# Patient Record
Sex: FEMALE | NOT HISPANIC OR LATINO | URBAN - METROPOLITAN AREA
[De-identification: names, ages, dates, MRNs, and addresses within clinical notes are randomized per-mention and may not be internally consistent; named-entity substitution may affect disease eponyms.]

---

## 2020-07-20 ENCOUNTER — TELEPHONE ENCOUNTER (OUTPATIENT)
Dept: URBAN - METROPOLITAN AREA CLINIC 35 | Facility: CLINIC | Age: 75
End: 2020-07-20

## 2020-07-20 ENCOUNTER — OFFICE VISIT (OUTPATIENT)
Dept: URBAN - METROPOLITAN AREA CLINIC 35 | Facility: CLINIC | Age: 75
End: 2020-07-20

## 2020-07-20 VITALS — HEIGHT: 64 IN | BODY MASS INDEX: 22.53 KG/M2 | WEIGHT: 132 LBS

## 2020-07-20 PROBLEM — 196735001 CSG - CHRONIC SUPERFICIAL GASTRITIS: Status: ACTIVE | Noted: 2018-05-07

## 2020-07-20 PROBLEM — 428283002 HISTORY OF POLYP OF COLON (SITUATION): Status: ACTIVE | Noted: 2018-03-07

## 2020-07-20 PROBLEM — 302914006 BARRETT'S ESOPHAGUS: Status: ACTIVE | Noted: 2018-05-07

## 2020-07-20 RX ORDER — LYSINE 500 MG
1 TABLET ORAL DAILY
Status: ON HOLD | COMMUNITY

## 2020-07-20 RX ORDER — NIACINAMIDE 500 MG
AS DIRECTED TABLET ORAL
Status: DISCONTINUED | COMMUNITY

## 2020-07-20 RX ORDER — AMITRIPTYLINE HYDROCHLORIDE 10 MG/1
1/2 TABLET TABLET, FILM COATED ORAL
Status: ACTIVE | COMMUNITY
Start: 2019-03-06

## 2020-07-20 RX ORDER — POLYPODIUM LEUCOTOMOS EXTRACT 240 MG
1 CAPSULE CAPSULE ORAL ONCE A DAY
Qty: 30 | Status: ACTIVE | COMMUNITY

## 2020-07-20 RX ORDER — LYSINE 500 MG
AS DIRECTED TABLET ORAL
Status: ACTIVE | COMMUNITY

## 2020-07-20 RX ORDER — PANTOPRAZOLE SODIUM 40 MG/1
1 TABLET TABLET, DELAYED RELEASE ORAL ONCE A DAY
Qty: 90 TABLET | Refills: 3 | Status: ACTIVE | COMMUNITY
Start: 2018-05-07

## 2020-07-20 RX ORDER — GUARN/MA-HUANG/P.GIN/S.GINSENG
2 TABLET ORAL TWICE A DAY
Status: ACTIVE | COMMUNITY

## 2020-07-20 RX ORDER — POLYETHYLENE GLYCOL 3350 17 G/17G
2 TABLESPOONS POWDER, FOR SOLUTION ORAL ONCE A DAY
Status: ON HOLD | COMMUNITY

## 2020-07-20 RX ORDER — CHOLECALCIFEROL (VITAMIN D3) 50 MCG
1 TABLET TABLET ORAL ONCE A DAY
Qty: 30 | Status: ACTIVE | COMMUNITY

## 2020-07-20 RX ORDER — FAMOTIDINE 40 MG/1
1 TABLET AT BEDTIME TABLET, FILM COATED ORAL ONCE A DAY
Qty: 30 | Refills: 1 | Status: ACTIVE | COMMUNITY
Start: 2019-10-25

## 2020-07-20 RX ORDER — ACITRETIN 10 MG/1
1 CAPSULE WITH A MEAL CAPSULE ORAL ONCE A DAY
Status: ON HOLD | COMMUNITY

## 2020-07-20 RX ORDER — DICLOFENAC SODIUM 75 MG/1
1 TABLET WITH FOOD OR MILK TABLET, DELAYED RELEASE ORAL TWICE A DAY
Qty: 60 | Status: ACTIVE | COMMUNITY

## 2020-07-20 RX ORDER — MULTIVITAMIN WITH IRON
1 TABLET TABLET ORAL ONCE A DAY
Status: ACTIVE | COMMUNITY

## 2020-07-20 NOTE — HPI-MIGRATED HPI
;   ;     Tellez's Esophagus : 75 y/o female patient presents today for a follow up of Tellez's Esophagus. Patient reports a flare up that began 1-2 weeks ago. She describes her symptoms as mild nausea and borborygmus. She admits that after each meal mainly with sugar incorporated she will continue to have episodes of eructation that will last for 30 minutes.   Her last colonoscopy was completed in 2018. She admits the continued use of  Pantoprazole 40 mg 1 QD and Famotidine 40 mg.  She denies that these medications relieve her symptoms. She would like to know if she could increase the these medications at this time or should she try a different medication.   She denies dysphagia, globus, odynophagia.   She has been adhering to anti reflux diet.   She currently admits 1 bowel movement with the use of MiraLax. She admits a longstanding hx of this. She admits that her stools are formed and soft with no blood or mucus present.   She admits that she walks 3-4 miles every other day. And will lift weights on the alternate days.    Last visit (10/25/2019) On last visit 03/06/2019, Patient was found to have Tellez's esophagus via EGD (4/25/2018) She has been taking Pantoprazole 40 mg 1 QD.  She denies dysphagia, globus, odynophagia, or heartburn.   She has been adhering to anti reflux diet.  Admit belching postprandially since April 2018, less frequent since last visit. Patient was found to have Tellez's esophagus via EGD (4/25/2018) She has been taking Pantoprazole 40 mg 1 QD.  She denies dysphagia, globus, odynophagia, or heartburn.   She has been adhering to anti reflux diet.  Admit belching postprandially since April 2018, less frequent since last visit. ;   Nausea : Patient admits episodes of mild nausea once every 2 weeks since her last visit.   Last visit (10/25/2019) Patient presents today with complaints of nausea, especially when her "stomach acts up." The nausea will come and go. She states that her most recent bout with nausea lasted for 2 weeks.   She denies any vomiting, dysphagia, heartburn/acid reflux.  She admits decreased appetite.  She states that she can't pin point triggers to the nausea. She states that her appetite is small and will consume protein powder to supplement her nutrition.   She will take Phenergan PRN, rarely with relief of symptoms. She has tried Zofran, FD guard, Omeprazole 40, Paroxetine HCL 10 mg in the past with minimal relief of symptoms  She is currently taking Pantoprazole 40 mg one tab daily with control of heartburn/reflux symptoms.  She was advised to take Ranitidine 150 mg which she doesn't take that often and she didn't notice any relief of symptoms with the Ranitidine.  She had a GE study 02/2019 that was normal. She had an EGD 02/19/2019 that revealed Tellez's esophagus.  On last visit 03/06/2019, Admit nausea symptoms over the past year.  Symptoms occur daily without provocation.  She will take Phenergan 25 mg 1/2 prn with some relief. Admit nausea symptoms over the past year.  Symptoms occur daily without provocation.  She will take Phenergan 25 mg 1/2 prn with some relief. ;

## 2020-07-23 ENCOUNTER — TELEPHONE ENCOUNTER (OUTPATIENT)
Dept: URBAN - METROPOLITAN AREA CLINIC 35 | Facility: CLINIC | Age: 75
End: 2020-07-23

## 2020-08-18 ENCOUNTER — TELEPHONE ENCOUNTER (OUTPATIENT)
Dept: URBAN - METROPOLITAN AREA CLINIC 35 | Facility: CLINIC | Age: 75
End: 2020-08-18

## 2020-08-18 RX ORDER — RIFAXIMIN 550 MG/1
1 TABLET TABLET ORAL THREE TIMES A DAY
Qty: 42 TABLET | Refills: 0 | OUTPATIENT
Start: 2020-08-18

## 2020-08-19 ENCOUNTER — TELEPHONE ENCOUNTER (OUTPATIENT)
Dept: URBAN - METROPOLITAN AREA CLINIC 35 | Facility: CLINIC | Age: 75
End: 2020-08-19

## 2020-08-19 RX ORDER — RIFAXIMIN 550 MG/1
1 TABLET TABLET ORAL THREE TIMES A DAY
Qty: 42 | Refills: 0 | OUTPATIENT
Start: 2020-08-18

## 2020-08-20 ENCOUNTER — TELEPHONE ENCOUNTER (OUTPATIENT)
Dept: URBAN - METROPOLITAN AREA CLINIC 35 | Facility: CLINIC | Age: 75
End: 2020-08-20

## 2020-08-21 ENCOUNTER — TELEPHONE ENCOUNTER (OUTPATIENT)
Dept: URBAN - METROPOLITAN AREA CLINIC 35 | Facility: CLINIC | Age: 75
End: 2020-08-21

## 2020-08-21 RX ORDER — FLUCONAZOLE 150 MG/1
1 TABLET TABLET ORAL ONCE A DAY
Qty: 2 TABLET | Refills: 0 | OUTPATIENT
Start: 2020-08-24

## 2020-09-08 ENCOUNTER — TELEPHONE ENCOUNTER (OUTPATIENT)
Dept: URBAN - METROPOLITAN AREA CLINIC 35 | Facility: CLINIC | Age: 75
End: 2020-09-08

## 2020-10-13 ENCOUNTER — TELEPHONE ENCOUNTER (OUTPATIENT)
Dept: URBAN - METROPOLITAN AREA CLINIC 35 | Facility: CLINIC | Age: 75
End: 2020-10-13

## 2020-10-13 RX ORDER — AMITRIPTYLINE HYDROCHLORIDE 10 MG/1
1 TABLET AT BEDTIME TABLET, FILM COATED ORAL ONCE A DAY
Qty: 30 | Refills: 3
Start: 2019-03-06

## 2020-10-21 ENCOUNTER — TELEPHONE ENCOUNTER (OUTPATIENT)
Dept: URBAN - METROPOLITAN AREA CLINIC 35 | Facility: CLINIC | Age: 75
End: 2020-10-21

## 2020-10-21 RX ORDER — FAMOTIDINE 40 MG/1
1 TABLET AT BEDTIME TABLET, FILM COATED ORAL ONCE A DAY
Qty: 30 | Refills: 6
Start: 2019-10-25

## 2020-11-25 ENCOUNTER — TELEPHONE ENCOUNTER (OUTPATIENT)
Dept: URBAN - METROPOLITAN AREA CLINIC 35 | Facility: CLINIC | Age: 75
End: 2020-11-25

## 2020-11-25 RX ORDER — PANTOPRAZOLE SODIUM 40 MG/1
1 TABLET TABLET, DELAYED RELEASE ORAL ONCE A DAY
Qty: 90 | Refills: 1
Start: 2018-05-07

## 2020-11-25 RX ORDER — AMITRIPTYLINE HYDROCHLORIDE 10 MG/1
1 TABLET AT BEDTIME TABLET, FILM COATED ORAL ONCE A DAY
Qty: 90 TABLET | Refills: 1
Start: 2019-03-06

## 2020-12-07 ENCOUNTER — TELEPHONE ENCOUNTER (OUTPATIENT)
Dept: URBAN - METROPOLITAN AREA CLINIC 35 | Facility: CLINIC | Age: 75
End: 2020-12-07

## 2021-08-31 ENCOUNTER — TELEPHONE ENCOUNTER (OUTPATIENT)
Dept: URBAN - METROPOLITAN AREA CLINIC 35 | Facility: CLINIC | Age: 76
End: 2021-08-31

## 2023-08-14 ENCOUNTER — OFFICE VISIT (OUTPATIENT)
Dept: URBAN - METROPOLITAN AREA CLINIC 35 | Facility: CLINIC | Age: 78
End: 2023-08-14

## 2023-08-14 NOTE — HPI-BARRETT'S ESOPHAGUS
77 year old female patient presents today for a follow up of Tellez's Esophagus and a surveillance EGD. Admits/Denies continued use of Pantoprazole and Pepcid prn. Admits/Denies any early satiety, dysphagia, or globus.   Last visit (2020): 75 y/o female patient presents today for a follow up of Tellez's Esophagus. Patient reports a flare up that began 1-2 weeks ago. She describes her symptoms as mild nausea and borborygmus. She admits that after each meal mainly with sugar incorporated she will continue to have episodes of eructation that will last for 30 minutes.   Her last colonoscopy was completed in 2018. She admits the continued use of  Pantoprazole 40 mg 1 QD and Famotidine 40 mg.  She denies that these medications relieve her symptoms. She would like to know if she could increase the these medications at this time or should she try a different medication.   She denies dysphagia, globus, odynophagia.   She has been adhering to anti reflux diet.   She currently admits 1 bowel movement with the use of MiraLax. She admits a longstanding hx of this. She admits that her stools are formed and soft with no blood or mucus present.   She admits that she walks 3-4 miles every other day. And will lift weights on the alternate days.

## 2023-08-21 ENCOUNTER — DASHBOARD ENCOUNTERS (OUTPATIENT)
Age: 78
End: 2023-08-21

## 2023-08-21 ENCOUNTER — OFFICE VISIT (OUTPATIENT)
Dept: URBAN - METROPOLITAN AREA CLINIC 35 | Facility: CLINIC | Age: 78
End: 2023-08-21
Payer: MEDICARE

## 2023-08-21 ENCOUNTER — WEB ENCOUNTER (OUTPATIENT)
Dept: URBAN - METROPOLITAN AREA CLINIC 35 | Facility: CLINIC | Age: 78
End: 2023-08-21

## 2023-08-21 VITALS
HEIGHT: 64 IN | SYSTOLIC BLOOD PRESSURE: 100 MMHG | DIASTOLIC BLOOD PRESSURE: 62 MMHG | WEIGHT: 129 LBS | BODY MASS INDEX: 22.02 KG/M2

## 2023-08-21 DIAGNOSIS — Z86.010 PERSONAL HISTORY OF COLONIC POLYPS: ICD-10-CM

## 2023-08-21 DIAGNOSIS — K22.70 BARRETT'S ESOPHAGUS WITHOUT DYSPLASIA: ICD-10-CM

## 2023-08-21 PROCEDURE — 99204 OFFICE O/P NEW MOD 45 MIN: CPT | Performed by: PHYSICIAN ASSISTANT

## 2023-08-21 RX ORDER — PANTOPRAZOLE SODIUM 40 MG/1
1 TABLET TABLET, DELAYED RELEASE ORAL ONCE A DAY
Qty: 90 | Refills: 3

## 2023-08-21 RX ORDER — FLUCONAZOLE 150 MG/1
1 TABLET TABLET ORAL ONCE A DAY
Qty: 2 TABLET | Refills: 0 | Status: ON HOLD | COMMUNITY
Start: 2020-08-24

## 2023-08-21 RX ORDER — ATORVASTATIN CALCIUM 10 MG/1
1 TABLET TABLET, FILM COATED ORAL ONCE A DAY
Status: ACTIVE | COMMUNITY

## 2023-08-21 RX ORDER — ACITRETIN 10 MG/1
1 CAPSULE WITH A MEAL CAPSULE ORAL ONCE A DAY
Status: ON HOLD | COMMUNITY

## 2023-08-21 RX ORDER — FISH OIL/BORAGE/FLAX/OM3,6,9 1 400-400 MG
AS DIRECTED CAPSULE ORAL
Status: ACTIVE | COMMUNITY

## 2023-08-21 RX ORDER — POLYPODIUM LEUCOTOMOS EXTRACT 240 MG
1 CAPSULE CAPSULE ORAL ONCE A DAY
Qty: 30 | Status: ACTIVE | COMMUNITY

## 2023-08-21 RX ORDER — FAMOTIDINE 40 MG/1
1 TABLET AT BEDTIME TABLET, FILM COATED ORAL ONCE A DAY
Qty: 30 | Refills: 6 | Status: ACTIVE | COMMUNITY
Start: 2019-10-25

## 2023-08-21 RX ORDER — FAMOTIDINE 40 MG/1
1 TABLET AT BEDTIME TABLET, FILM COATED ORAL ONCE A DAY
Qty: 90 TABLET | Refills: 3 | OUTPATIENT
Start: 2023-08-21

## 2023-08-21 RX ORDER — MEMANTINE HYDROCHLORIDE 5 MG/1
1 TABLET TABLET ORAL ONCE A DAY
Status: ACTIVE | COMMUNITY

## 2023-08-21 RX ORDER — RIFAXIMIN 550 MG/1
1 TABLET TABLET ORAL THREE TIMES A DAY
Qty: 42 | Refills: 0 | Status: ON HOLD | COMMUNITY
Start: 2020-08-18

## 2023-08-21 RX ORDER — AMITRIPTYLINE HYDROCHLORIDE 10 MG/1
1 TABLET AT BEDTIME TABLET, FILM COATED ORAL ONCE A DAY
Qty: 90 TABLET | Refills: 1 | Status: ON HOLD | COMMUNITY
Start: 2019-03-06

## 2023-08-21 RX ORDER — DICLOFENAC SODIUM 75 MG/1
1 TABLET WITH FOOD OR MILK TABLET, DELAYED RELEASE ORAL TWICE A DAY
Qty: 60 | Status: ON HOLD | COMMUNITY

## 2023-08-21 RX ORDER — POLYETHYLENE GLYCOL 3350 17 G/17G
2 TABLESPOONS POWDER, FOR SOLUTION ORAL ONCE A DAY
Status: ON HOLD | COMMUNITY

## 2023-08-21 RX ORDER — RIVASTIGMINE TARTRATE 1.5 MG/1
1 CAPSULE WITH FOOD CAPSULE ORAL TWICE A DAY
Status: ACTIVE | COMMUNITY

## 2023-08-21 RX ORDER — HYDROXYCHLOROQUINE SULFATE 200 MG/1
TABLET ORAL
Qty: 39 TABLET | Status: ACTIVE | COMMUNITY

## 2023-08-21 RX ORDER — PANTOPRAZOLE SODIUM 40 MG/1
1 TABLET TABLET, DELAYED RELEASE ORAL ONCE A DAY
Qty: 90 | Refills: 1 | Status: ACTIVE | COMMUNITY
Start: 2018-05-07

## 2023-08-21 RX ORDER — MAGNESIUM 200 MG
2 TABLETS WITH A MEAL TABLET ORAL ONCE A DAY
Status: ACTIVE | COMMUNITY

## 2023-08-21 RX ORDER — CHOLECALCIFEROL (VITAMIN D3) 50 MCG
1 TABLET TABLET ORAL ONCE A DAY
Qty: 30 | Status: ON HOLD | COMMUNITY

## 2023-08-21 NOTE — HPI-BARRETT'S ESOPHAGUS
77 year old female patient presents today for a follow up of Tellez's Esophagus and a surveillance EGD. Admits continued use of Pantoprazole and Pepcid prn. Denies any early satiety, dysphagia, or globus.  Last EGD 2019, and she knows she needs a repeat.  She also had colon polyps precancerous, 2018, and refuses to repeat her colon as well.  She avoids spicy foods, and states her reflux is under control.  Last visit (2020): 73 y/o female patient presents today for a follow up of Tellez's Esophagus. Patient reports a flare up that began 1-2 weeks ago. She describes her symptoms as mild nausea and borborygmus. She admits that after each meal mainly with sugar incorporated she will continue to have episodes of eructation that will last for 30 minutes.   Her last colonoscopy was completed in 2018. She admits the continued use of  Pantoprazole 40 mg 1 QD and Famotidine 40 mg.  She denies that these medications relieve her symptoms. She would like to know if she could increase the these medications at this time or should she try a different medication.   She denies dysphagia, globus, odynophagia.   She has been adhering to anti reflux diet.   She currently admits 1 bowel movement with the use of MiraLax. She admits a longstanding hx of this. She admits that her stools are formed and soft with no blood or mucus present.   She admits that she walks 3-4 miles every other day. And will lift weights on the alternate days.

## 2024-09-17 ENCOUNTER — TELEPHONE ENCOUNTER (OUTPATIENT)
Dept: URBAN - METROPOLITAN AREA CLINIC 35 | Facility: CLINIC | Age: 79
End: 2024-09-17

## 2024-10-01 ENCOUNTER — OFFICE VISIT (OUTPATIENT)
Dept: URBAN - METROPOLITAN AREA CLINIC 35 | Facility: CLINIC | Age: 79
End: 2024-10-01
Payer: MEDICARE

## 2024-10-01 VITALS
WEIGHT: 128 LBS | HEIGHT: 64 IN | SYSTOLIC BLOOD PRESSURE: 100 MMHG | BODY MASS INDEX: 21.85 KG/M2 | DIASTOLIC BLOOD PRESSURE: 60 MMHG

## 2024-10-01 DIAGNOSIS — Z86.0101 H/O ADENOMATOUS POLYP OF COLON: ICD-10-CM

## 2024-10-01 DIAGNOSIS — K22.70 BARRETT'S ESOPHAGUS WITHOUT DYSPLASIA: ICD-10-CM

## 2024-10-01 PROCEDURE — 99213 OFFICE O/P EST LOW 20 MIN: CPT | Performed by: PHYSICIAN ASSISTANT

## 2024-10-01 RX ORDER — RIVASTIGMINE TARTRATE 1.5 MG/1
1 CAPSULE WITH FOOD CAPSULE ORAL TWICE A DAY
Status: ACTIVE | COMMUNITY

## 2024-10-01 RX ORDER — MEMANTINE HYDROCHLORIDE 5 MG/1
1 TABLET TABLET ORAL ONCE A DAY
Status: ACTIVE | COMMUNITY

## 2024-10-01 RX ORDER — MAGNESIUM 200 MG
2 TABLETS WITH A MEAL TABLET ORAL ONCE A DAY
Status: ACTIVE | COMMUNITY

## 2024-10-01 RX ORDER — ACITRETIN 10 MG/1
1 CAPSULE WITH A MEAL CAPSULE ORAL ONCE A DAY
Status: ON HOLD | COMMUNITY

## 2024-10-01 RX ORDER — LEVOTHYROXINE SODIUM 50 UG/1
1 TABLET IN THE MORNING ON AN EMPTY STOMACH TABLET ORAL ONCE A DAY
Status: ACTIVE | COMMUNITY

## 2024-10-01 RX ORDER — FAMOTIDINE 40 MG/1
1 TABLET AT BEDTIME TABLET, FILM COATED ORAL ONCE A DAY
Qty: 90 TABLET | Refills: 3 | OUTPATIENT

## 2024-10-01 RX ORDER — HYDROXYCHLOROQUINE SULFATE 200 MG/1
TABLET ORAL
Qty: 39 TABLET | Status: ON HOLD | COMMUNITY

## 2024-10-01 RX ORDER — PANTOPRAZOLE SODIUM 40 MG/1
1 TABLET TABLET, DELAYED RELEASE ORAL ONCE A DAY
Qty: 90 | Refills: 3

## 2024-10-01 RX ORDER — ATORVASTATIN CALCIUM 10 MG/1
1 TABLET TABLET, FILM COATED ORAL ONCE A DAY
Status: ACTIVE | COMMUNITY

## 2024-10-01 RX ORDER — FAMOTIDINE 40 MG/1
1 TABLET AT BEDTIME TABLET, FILM COATED ORAL ONCE A DAY
Qty: 30 | Refills: 6 | Status: ACTIVE | COMMUNITY
Start: 2019-10-25

## 2024-10-01 RX ORDER — POLYPODIUM LEUCOTOMOS EXTRACT 240 MG
1 CAPSULE CAPSULE ORAL ONCE A DAY
Qty: 30 | Status: ACTIVE | COMMUNITY

## 2024-10-01 RX ORDER — DICLOFENAC SODIUM 75 MG/1
1 TABLET WITH FOOD OR MILK TABLET, DELAYED RELEASE ORAL TWICE A DAY
Qty: 60 | Status: ON HOLD | COMMUNITY

## 2024-10-01 RX ORDER — NIACIN 100 MG
1 TABLET WITH FOOD TABLET ORAL ONCE A DAY
Status: ACTIVE | COMMUNITY

## 2024-10-01 RX ORDER — RIFAXIMIN 550 MG/1
1 TABLET TABLET ORAL THREE TIMES A DAY
Qty: 42 | Refills: 0 | Status: ON HOLD | COMMUNITY
Start: 2020-08-18

## 2024-10-01 RX ORDER — FLUCONAZOLE 150 MG/1
1 TABLET TABLET ORAL ONCE A DAY
Qty: 2 TABLET | Refills: 0 | Status: ON HOLD | COMMUNITY
Start: 2020-08-24

## 2024-10-01 RX ORDER — FISH OIL/BORAGE/FLAX/OM3,6,9 1 400-400 MG
AS DIRECTED CAPSULE ORAL
Status: ACTIVE | COMMUNITY

## 2024-10-01 RX ORDER — CHOLECALCIFEROL (VITAMIN D3) 50 MCG
1 TABLET TABLET ORAL ONCE A DAY
Qty: 30 | Status: ACTIVE | COMMUNITY

## 2024-10-01 RX ORDER — FAMOTIDINE 40 MG/1
1 TABLET AT BEDTIME TABLET, FILM COATED ORAL ONCE A DAY
Qty: 90 TABLET | Refills: 3 | Status: ON HOLD | COMMUNITY
Start: 2023-08-21

## 2024-10-01 RX ORDER — POLYETHYLENE GLYCOL 3350 17 G/17G
2 TABLESPOONS POWDER, FOR SOLUTION ORAL ONCE A DAY
Status: ON HOLD | COMMUNITY

## 2024-10-01 RX ORDER — SODIUM PICOSULFATE, MAGNESIUM OXIDE, AND ANHYDROUS CITRIC ACID 12; 3.5; 1 G/175ML; G/175ML; MG/175ML
175 ML THE FIRST DOSE THE EVENING BEFORE AND SECOND DOSE THE MORNING OF COLONOSCOPY LIQUID ORAL ONCE A DAY
Qty: 350 | Refills: 0 | OUTPATIENT
Start: 2024-10-01 | End: 2024-10-03

## 2024-10-01 RX ORDER — AMITRIPTYLINE HYDROCHLORIDE 10 MG/1
1 TABLET AT BEDTIME TABLET, FILM COATED ORAL ONCE A DAY
Qty: 90 TABLET | Refills: 1 | Status: ON HOLD | COMMUNITY
Start: 2019-03-06

## 2024-10-01 RX ORDER — PANTOPRAZOLE SODIUM 40 MG/1
1 TABLET TABLET, DELAYED RELEASE ORAL ONCE A DAY
Qty: 90 | Refills: 3 | Status: ACTIVE | COMMUNITY

## 2024-10-01 NOTE — HPI-BARRETT'S ESOPHAGUS
Patient presents today for a follow up of Tellez's Esophagus. She admits continued use of Pantoprazole and Famotidine. She denies any early satiety, dysphagia, or globus. Last EGD 2019. No abd pain, no N/V.  Last visit: 77 year old female patient presents today for a follow up of Tellez's Esophagus and a surveillance EGD. Admits continued use of Pantoprazole and Pepcid prn. Denies any early satiety, dysphagia, or globus.  Last EGD 2019, and she knows she needs a repeat.  She also had colon polyps precancerous, 2018, and refuses to repeat her colon as well.  She avoids spicy foods, and states her reflux is under control.  Last visit (2020): 73 y/o female patient presents today for a follow up of Tellez's Esophagus. Patient reports a flare up that began 1-2 weeks ago. She describes her symptoms as mild nausea and borborygmus. She admits that after each meal mainly with sugar incorporated she will continue to have episodes of eructation that will last for 30 minutes.   Her last colonoscopy was completed in 2018. She admits the continued use of  Pantoprazole 40 mg 1 QD and Famotidine 40 mg.  She denies that these medications relieve her symptoms. She would like to know if she could increase the these medications at this time or should she try a different medication.   She denies dysphagia, globus, odynophagia.   She has been adhering to anti reflux diet.   She currently admits 1 bowel movement with the use of MiraLax. She admits a longstanding hx of this. She admits that her stools are formed and soft with no blood or mucus present.   She admits that she walks 3-4 miles every other day. And will lift weights on the alternate days.

## 2024-10-04 ENCOUNTER — TELEPHONE ENCOUNTER (OUTPATIENT)
Dept: URBAN - METROPOLITAN AREA CLINIC 33 | Facility: CLINIC | Age: 79
End: 2024-10-04

## 2024-10-04 RX ORDER — SODIUM PICOSULFATE, MAGNESIUM OXIDE, AND ANHYDROUS CITRIC ACID 12; 3.5; 1 G/175ML; G/175ML; MG/175ML
175 ML THE FIRST DOSE THE EVENING BEFORE AND SECOND DOSE THE MORNING OF COLONOSCOPY LIQUID ORAL ONCE A DAY
Qty: 350 | Refills: 0 | OUTPATIENT
Start: 2024-10-07 | End: 2024-10-09

## 2024-12-12 ENCOUNTER — CLAIMS CREATED FROM THE CLAIM WINDOW (OUTPATIENT)
Dept: URBAN - METROPOLITAN AREA SURGERY CENTER 8 | Facility: SURGERY CENTER | Age: 79
End: 2024-12-12
Payer: MEDICARE

## 2024-12-12 ENCOUNTER — CLAIMS CREATED FROM THE CLAIM WINDOW (OUTPATIENT)
Dept: URBAN - METROPOLITAN AREA CLINIC 4 | Facility: CLINIC | Age: 79
End: 2024-12-12
Payer: MEDICARE

## 2024-12-12 DIAGNOSIS — K29.70 GASTRITIS, UNSPECIFIED, WITHOUT BLEEDING: ICD-10-CM

## 2024-12-12 DIAGNOSIS — D12.2 ADENOMA OF ASCENDING COLON: ICD-10-CM

## 2024-12-12 DIAGNOSIS — Z86.0100 PERSONAL HISTORY OF COLONIC POLYPS: ICD-10-CM

## 2024-12-12 DIAGNOSIS — K31.89 OTHER DISEASES OF STOMACH AND DUODENUM: ICD-10-CM

## 2024-12-12 DIAGNOSIS — K21.9 GASTRO-ESOPHAGEAL REFLUX DISEASE WITHOUT ESOPHAGITIS: ICD-10-CM

## 2024-12-12 DIAGNOSIS — K29.70 REACTIVE GASTROPATHY: ICD-10-CM

## 2024-12-12 DIAGNOSIS — D12.3 ADENOMA OF TRANSVERSE COLON: ICD-10-CM

## 2024-12-12 DIAGNOSIS — Z86.0101 PERSONAL HISTORY OF ADENOMATOUS AND SERRATED COLON POLYPS: ICD-10-CM

## 2024-12-12 DIAGNOSIS — D12.3 BENIGN NEOPLASM OF HEPATIC FLEXURE OF COLON: ICD-10-CM

## 2024-12-12 DIAGNOSIS — D12.2 BENIGN NEOPLASM OF ASCENDING COLON: ICD-10-CM

## 2024-12-12 DIAGNOSIS — K21.9 ACID REFLUX: ICD-10-CM

## 2024-12-12 PROCEDURE — 43239 EGD BIOPSY SINGLE/MULTIPLE: CPT | Performed by: INTERNAL MEDICINE

## 2024-12-12 PROCEDURE — 43239 EGD BIOPSY SINGLE/MULTIPLE: CPT | Performed by: CLINIC/CENTER

## 2024-12-12 PROCEDURE — 0529F INTRVL 3/>YR PTS CLNSCP DOCD: CPT | Performed by: CLINIC/CENTER

## 2024-12-12 PROCEDURE — 0529F INTRVL 3/>YR PTS CLNSCP DOCD: CPT | Performed by: INTERNAL MEDICINE

## 2024-12-12 PROCEDURE — 88312 SPECIAL STAINS GROUP 1: CPT | Performed by: PATHOLOGY

## 2024-12-12 PROCEDURE — 00813 ANES UPR LWR GI NDSC PX: CPT | Performed by: NURSE ANESTHETIST, CERTIFIED REGISTERED

## 2024-12-12 PROCEDURE — 45385 COLONOSCOPY W/LESION REMOVAL: CPT | Performed by: INTERNAL MEDICINE

## 2024-12-12 PROCEDURE — 88305 TISSUE EXAM BY PATHOLOGIST: CPT | Performed by: PATHOLOGY

## 2024-12-12 PROCEDURE — 45385 COLONOSCOPY W/LESION REMOVAL: CPT | Performed by: CLINIC/CENTER

## 2024-12-12 RX ORDER — RIVASTIGMINE TARTRATE 1.5 MG/1
1 CAPSULE WITH FOOD CAPSULE ORAL TWICE A DAY
Status: ACTIVE | COMMUNITY

## 2024-12-12 RX ORDER — DICLOFENAC SODIUM 75 MG/1
1 TABLET WITH FOOD OR MILK TABLET, DELAYED RELEASE ORAL TWICE A DAY
Qty: 60 | Status: ON HOLD | COMMUNITY

## 2024-12-12 RX ORDER — AMITRIPTYLINE HYDROCHLORIDE 10 MG/1
1 TABLET AT BEDTIME TABLET, FILM COATED ORAL ONCE A DAY
Qty: 90 TABLET | Refills: 1 | Status: ON HOLD | COMMUNITY
Start: 2019-03-06

## 2024-12-12 RX ORDER — NIACIN 100 MG
1 TABLET WITH FOOD TABLET ORAL ONCE A DAY
Status: ACTIVE | COMMUNITY

## 2024-12-12 RX ORDER — CHOLECALCIFEROL (VITAMIN D3) 50 MCG
1 TABLET TABLET ORAL ONCE A DAY
Qty: 30 | Status: ACTIVE | COMMUNITY

## 2024-12-12 RX ORDER — POLYPODIUM LEUCOTOMOS EXTRACT 240 MG
1 CAPSULE CAPSULE ORAL ONCE A DAY
Qty: 30 | Status: ACTIVE | COMMUNITY

## 2024-12-12 RX ORDER — FISH OIL/BORAGE/FLAX/OM3,6,9 1 400-400 MG
AS DIRECTED CAPSULE ORAL
Status: ACTIVE | COMMUNITY

## 2024-12-12 RX ORDER — ACITRETIN 10 MG/1
1 CAPSULE WITH A MEAL CAPSULE ORAL ONCE A DAY
Status: ON HOLD | COMMUNITY

## 2024-12-12 RX ORDER — LEVOTHYROXINE SODIUM 50 UG/1
1 TABLET IN THE MORNING ON AN EMPTY STOMACH TABLET ORAL ONCE A DAY
Status: ACTIVE | COMMUNITY

## 2024-12-12 RX ORDER — PANTOPRAZOLE SODIUM 40 MG/1
1 TABLET TABLET, DELAYED RELEASE ORAL ONCE A DAY
Qty: 90 | Refills: 3 | Status: ACTIVE | COMMUNITY

## 2024-12-12 RX ORDER — FAMOTIDINE 40 MG/1
1 TABLET AT BEDTIME TABLET, FILM COATED ORAL ONCE A DAY
Qty: 90 TABLET | Refills: 3 | Status: ACTIVE | COMMUNITY

## 2024-12-12 RX ORDER — HYDROXYCHLOROQUINE SULFATE 200 MG/1
TABLET ORAL
Qty: 39 TABLET | Status: ON HOLD | COMMUNITY

## 2024-12-12 RX ORDER — RIFAXIMIN 550 MG/1
1 TABLET TABLET ORAL THREE TIMES A DAY
Qty: 42 | Refills: 0 | Status: ON HOLD | COMMUNITY
Start: 2020-08-18

## 2024-12-12 RX ORDER — MEMANTINE HYDROCHLORIDE 5 MG/1
1 TABLET TABLET ORAL ONCE A DAY
Status: ACTIVE | COMMUNITY

## 2024-12-12 RX ORDER — ATORVASTATIN CALCIUM 10 MG/1
1 TABLET TABLET, FILM COATED ORAL ONCE A DAY
Status: ACTIVE | COMMUNITY

## 2024-12-12 RX ORDER — FAMOTIDINE 40 MG/1
1 TABLET AT BEDTIME TABLET, FILM COATED ORAL ONCE A DAY
Qty: 30 | Refills: 6 | Status: ACTIVE | COMMUNITY
Start: 2019-10-25

## 2024-12-12 RX ORDER — POLYETHYLENE GLYCOL 3350 17 G/17G
2 TABLESPOONS POWDER, FOR SOLUTION ORAL ONCE A DAY
Status: ON HOLD | COMMUNITY

## 2024-12-12 RX ORDER — MAGNESIUM 200 MG
2 TABLETS WITH A MEAL TABLET ORAL ONCE A DAY
Status: ACTIVE | COMMUNITY

## 2024-12-12 RX ORDER — FLUCONAZOLE 150 MG/1
1 TABLET TABLET ORAL ONCE A DAY
Qty: 2 TABLET | Refills: 0 | Status: ON HOLD | COMMUNITY
Start: 2020-08-24

## 2025-01-02 ENCOUNTER — OFFICE VISIT (OUTPATIENT)
Dept: URBAN - METROPOLITAN AREA CLINIC 35 | Facility: CLINIC | Age: 80
End: 2025-01-02
Payer: MEDICARE

## 2025-01-02 VITALS
WEIGHT: 132 LBS | SYSTOLIC BLOOD PRESSURE: 100 MMHG | OXYGEN SATURATION: 7 % | DIASTOLIC BLOOD PRESSURE: 50 MMHG | RESPIRATION RATE: 9 BRPM | HEIGHT: 64 IN | BODY MASS INDEX: 22.53 KG/M2 | HEART RATE: 77 BPM

## 2025-01-02 DIAGNOSIS — K44.9 HIATAL HERNIA: ICD-10-CM

## 2025-01-02 DIAGNOSIS — K31.89 GASTRIC FOVEOLAR HYPERPLASIA: ICD-10-CM

## 2025-01-02 DIAGNOSIS — K31.7 GASTRIC POLYPS: ICD-10-CM

## 2025-01-02 DIAGNOSIS — K57.30 ACQUIRED DIVERTICULOSIS OF COLON: ICD-10-CM

## 2025-01-02 DIAGNOSIS — D12.3 BENIGN NEOPLASM OF HEPATIC FLEXURE OF COLON: ICD-10-CM

## 2025-01-02 DIAGNOSIS — D12.2 ADENOMATOUS POLYP OF ASCENDING COLON: ICD-10-CM

## 2025-01-02 DIAGNOSIS — K22.70 BARRETT'S ESOPHAGUS WITHOUT DYSPLASIA: ICD-10-CM

## 2025-01-02 DIAGNOSIS — K64.8 INTERNAL HEMORRHOIDS: ICD-10-CM

## 2025-01-02 PROBLEM — 397881000: Status: ACTIVE | Noted: 2025-01-02

## 2025-01-02 PROBLEM — 78809005: Status: ACTIVE | Noted: 2025-01-02

## 2025-01-02 PROCEDURE — 99214 OFFICE O/P EST MOD 30 MIN: CPT | Performed by: PHYSICIAN ASSISTANT

## 2025-01-02 RX ORDER — FAMOTIDINE 40 MG/1
1 TABLET AT BEDTIME TABLET, FILM COATED ORAL ONCE A DAY
Qty: 90 TABLET | Refills: 3 | OUTPATIENT

## 2025-01-02 RX ORDER — HYDROXYCHLOROQUINE SULFATE 200 MG/1
TABLET ORAL
Qty: 39 TABLET | Status: ON HOLD | COMMUNITY

## 2025-01-02 RX ORDER — NIACIN 100 MG
1 TABLET WITH FOOD TABLET ORAL ONCE A DAY
Status: ACTIVE | COMMUNITY

## 2025-01-02 RX ORDER — POLYETHYLENE GLYCOL 3350 17 G/17G
2 TABLESPOONS POWDER, FOR SOLUTION ORAL ONCE A DAY
Status: ON HOLD | COMMUNITY

## 2025-01-02 RX ORDER — CHOLECALCIFEROL (VITAMIN D3) 50 MCG
1 TABLET TABLET ORAL ONCE A DAY
Qty: 30 | Status: ACTIVE | COMMUNITY

## 2025-01-02 RX ORDER — RIFAXIMIN 550 MG/1
1 TABLET TABLET ORAL THREE TIMES A DAY
Qty: 42 | Refills: 0 | Status: ON HOLD | COMMUNITY
Start: 2020-08-18

## 2025-01-02 RX ORDER — PANTOPRAZOLE SODIUM 40 MG/1
1 TABLET TABLET, DELAYED RELEASE ORAL ONCE A DAY
Qty: 90 | Refills: 3

## 2025-01-02 RX ORDER — FAMOTIDINE 40 MG/1
1 TABLET AT BEDTIME TABLET, FILM COATED ORAL ONCE A DAY
Qty: 30 | Refills: 6 | Status: ACTIVE | COMMUNITY
Start: 2019-10-25

## 2025-01-02 RX ORDER — MEMANTINE HYDROCHLORIDE 5 MG/1
1 TABLET TABLET ORAL ONCE A DAY
Status: ACTIVE | COMMUNITY

## 2025-01-02 RX ORDER — ATORVASTATIN CALCIUM 10 MG/1
1 TABLET TABLET, FILM COATED ORAL ONCE A DAY
Status: ACTIVE | COMMUNITY

## 2025-01-02 RX ORDER — FAMOTIDINE 40 MG/1
1 TABLET AT BEDTIME TABLET, FILM COATED ORAL ONCE A DAY
Qty: 90 TABLET | Refills: 3 | Status: ACTIVE | COMMUNITY

## 2025-01-02 RX ORDER — DICLOFENAC SODIUM 75 MG/1
1 TABLET WITH FOOD OR MILK TABLET, DELAYED RELEASE ORAL TWICE A DAY
Qty: 60 | Status: ON HOLD | COMMUNITY

## 2025-01-02 RX ORDER — MAGNESIUM 200 MG
2 TABLETS WITH A MEAL TABLET ORAL ONCE A DAY
Status: ACTIVE | COMMUNITY

## 2025-01-02 RX ORDER — ACITRETIN 10 MG/1
1 CAPSULE WITH A MEAL CAPSULE ORAL ONCE A DAY
Status: ON HOLD | COMMUNITY

## 2025-01-02 RX ORDER — RIVASTIGMINE TARTRATE 1.5 MG/1
1 CAPSULE WITH FOOD CAPSULE ORAL TWICE A DAY
Status: ACTIVE | COMMUNITY

## 2025-01-02 RX ORDER — POLYPODIUM LEUCOTOMOS EXTRACT 240 MG
1 CAPSULE CAPSULE ORAL ONCE A DAY
Qty: 30 | Status: ACTIVE | COMMUNITY

## 2025-01-02 RX ORDER — FLUCONAZOLE 150 MG/1
1 TABLET TABLET ORAL ONCE A DAY
Qty: 2 TABLET | Refills: 0 | Status: ON HOLD | COMMUNITY
Start: 2020-08-24

## 2025-01-02 RX ORDER — AMITRIPTYLINE HYDROCHLORIDE 10 MG/1
1 TABLET AT BEDTIME TABLET, FILM COATED ORAL ONCE A DAY
Qty: 90 TABLET | Refills: 1 | Status: ON HOLD | COMMUNITY
Start: 2019-03-06

## 2025-01-02 RX ORDER — FISH OIL/BORAGE/FLAX/OM3,6,9 1 400-400 MG
AS DIRECTED CAPSULE ORAL
Status: ACTIVE | COMMUNITY

## 2025-01-02 RX ORDER — PANTOPRAZOLE SODIUM 40 MG/1
1 TABLET TABLET, DELAYED RELEASE ORAL ONCE A DAY
Qty: 90 | Refills: 3 | Status: ACTIVE | COMMUNITY

## 2025-01-02 RX ORDER — LEVOTHYROXINE SODIUM 50 UG/1
1 TABLET IN THE MORNING ON AN EMPTY STOMACH TABLET ORAL ONCE A DAY
Status: ACTIVE | COMMUNITY

## 2025-01-02 NOTE — HPI-PERSONAL HISTORY OF COLON POLYPS
Colonoscopy report shows: The examined portion of the ileum was normal. Three 4 to 7 mm tubular adenomas in the proximal ascending colon and at the hepatic flexure. Diverticulosis in the sigmoid colon and in the descending colon. Internal hemorrhoids. The examination was otherwise normal on direct and retroflexion views.  Last visit: Pt last had a colonoscopy with a TA removed in 2016. She admits to daily bowel movements. No blood in stool, denies any bowel issues or changes. No abd pain.

## 2025-01-02 NOTE — HPI-BARRETT'S ESOPHAGUS
Patient presents today for a follow up from her procedure. She currently denies any complications . She admits taking Pantoprazole 40 mg and Famotidine Tablet, 40 MG.  EGD report shows: Z-line irregular, 37 cm from the incisors. Biopsied. 1 cm, small sliding type hiatal hernia. Normal esophagus. No obvious salmon color mucosa extending into distal esophagus beyond the irregular Z line. Erythematous mucosa in the antrum and gastric body. Biopsied. Multiple small gastric polyps. Biopsied. Normal examined duodenum. Duodenal diverticulum. Stomach, Antrum;Body, Biopsy (Cold Forceps): MILD CHEMICAL/REACTIVE GASTROPATHY. Stomach, Body;Fundus, Biopsy (Cold Forceps): FOVEOLAR HYPERPLASIA.  Gastroesophageal Junction, Biopsy (Cold Forceps): SQUAMOCOLUMNAR MUCOSA WITH REFLUX-TYPE CHANGES.  Last visit: Patient presents today for a follow up of Tellez's Esophagus. She admits continued use of Pantoprazole and Famotidine. She denies any early satiety, dysphagia, or globus. Last EGD 2019. No abd pain, no N/V.  Last visit: 77 year old female patient presents today for a follow up of Tellez's Esophagus and a surveillance EGD. Admits continued use of Pantoprazole and Pepcid prn. Denies any early satiety, dysphagia, or globus.  Last EGD 2019, and she knows she needs a repeat.  She also had colon polyps precancerous, 2018, and refuses to repeat her colon as well.  She avoids spicy foods, and states her reflux is under control.  Last visit (2020): 75 y/o female patient presents today for a follow up of Tellez's Esophagus. Patient reports a flare up that began 1-2 weeks ago. She describes her symptoms as mild nausea and borborygmus. She admits that after each meal mainly with sugar incorporated she will continue to have episodes of eructation that will last for 30 minutes.   Her last colonoscopy was completed in 2018. She admits the continued use of  Pantoprazole 40 mg 1 QD and Famotidine 40 mg.  She denies that these medications relieve her symptoms. She would like to know if she could increase the these medications at this time or should she try a different medication.   She denies dysphagia, globus, odynophagia.   She has been adhering to anti reflux diet.   She currently admits 1 bowel movement with the use of MiraLax. She admits a longstanding hx of this. She admits that her stools are formed and soft with no blood or mucus present.   She admits that she walks 3-4 miles every other day. And will lift weights on the alternate days.

## 2025-08-07 ENCOUNTER — TELEPHONE ENCOUNTER (OUTPATIENT)
Dept: URBAN - METROPOLITAN AREA CLINIC 35 | Facility: CLINIC | Age: 80
End: 2025-08-07

## 2025-08-07 RX ORDER — FAMOTIDINE 40 MG/1
1 TABLET AT BEDTIME TABLET, FILM COATED ORAL ONCE A DAY
Qty: 90 TABLET | Refills: 0

## 2025-08-07 RX ORDER — PANTOPRAZOLE SODIUM 40 MG/1
1 TABLET TABLET, DELAYED RELEASE ORAL ONCE A DAY
Qty: 90 | Refills: 0